# Patient Record
Sex: FEMALE | Race: WHITE | Employment: PART TIME | URBAN - METROPOLITAN AREA
[De-identification: names, ages, dates, MRNs, and addresses within clinical notes are randomized per-mention and may not be internally consistent; named-entity substitution may affect disease eponyms.]

---

## 2018-08-12 ENCOUNTER — APPOINTMENT (OUTPATIENT)
Dept: GENERAL RADIOLOGY | Age: 53
End: 2018-08-12
Payer: COMMERCIAL

## 2018-08-12 ENCOUNTER — HOSPITAL ENCOUNTER (EMERGENCY)
Age: 53
Discharge: HOME | End: 2018-08-12
Attending: EMERGENCY MEDICINE
Payer: COMMERCIAL

## 2018-08-12 VITALS
DIASTOLIC BLOOD PRESSURE: 80 MMHG | SYSTOLIC BLOOD PRESSURE: 130 MMHG | OXYGEN SATURATION: 98 % | HEIGHT: 62 IN | BODY MASS INDEX: 33.13 KG/M2 | HEART RATE: 82 BPM | TEMPERATURE: 98.6 F | RESPIRATION RATE: 12 BRPM | WEIGHT: 180 LBS

## 2018-08-12 DIAGNOSIS — J18.9 PNEUMONIA DUE TO ORGANISM: Primary | ICD-10-CM

## 2018-08-12 PROCEDURE — 94664 DEMO&/EVAL PT USE INHALER: CPT

## 2018-08-12 PROCEDURE — 99283 EMERGENCY DEPT VISIT LOW MDM: CPT

## 2018-08-12 PROCEDURE — 6370000000 HC RX 637 (ALT 250 FOR IP): Performed by: EMERGENCY MEDICINE

## 2018-08-12 PROCEDURE — 94640 AIRWAY INHALATION TREATMENT: CPT

## 2018-08-12 PROCEDURE — 94761 N-INVAS EAR/PLS OXIMETRY MLT: CPT

## 2018-08-12 PROCEDURE — 71046 X-RAY EXAM CHEST 2 VIEWS: CPT

## 2018-08-12 RX ORDER — ALBUTEROL SULFATE 90 UG/1
2 AEROSOL, METERED RESPIRATORY (INHALATION) EVERY 6 HOURS PRN
COMMUNITY
End: 2019-10-12

## 2018-08-12 RX ORDER — IPRATROPIUM BROMIDE AND ALBUTEROL SULFATE 2.5; .5 MG/3ML; MG/3ML
1 SOLUTION RESPIRATORY (INHALATION) ONCE
Status: COMPLETED | OUTPATIENT
Start: 2018-08-12 | End: 2018-08-12

## 2018-08-12 RX ORDER — PREDNISONE 20 MG/1
60 TABLET ORAL DAILY
Qty: 12 TABLET | Refills: 0 | Status: SHIPPED | OUTPATIENT
Start: 2018-08-12 | End: 2019-10-12

## 2018-08-12 RX ORDER — LEVOFLOXACIN 750 MG/1
750 TABLET ORAL DAILY
Qty: 7 TABLET | Refills: 0 | Status: SHIPPED | OUTPATIENT
Start: 2018-08-12 | End: 2018-08-14 | Stop reason: ALTCHOICE

## 2018-08-12 RX ORDER — MELOXICAM 15 MG/1
15 TABLET ORAL DAILY
COMMUNITY
End: 2019-10-12

## 2018-08-12 RX ORDER — PREDNISONE 20 MG/1
60 TABLET ORAL ONCE
Status: COMPLETED | OUTPATIENT
Start: 2018-08-12 | End: 2018-08-12

## 2018-08-12 RX ORDER — LEVOFLOXACIN 500 MG/1
500 TABLET, FILM COATED ORAL DAILY
Status: DISCONTINUED | OUTPATIENT
Start: 2018-08-12 | End: 2018-08-12 | Stop reason: HOSPADM

## 2018-08-12 RX ADMIN — LEVOFLOXACIN 500 MG: 500 TABLET, FILM COATED ORAL at 12:24

## 2018-08-12 RX ADMIN — PREDNISONE 60 MG: 20 TABLET ORAL at 11:49

## 2018-08-12 RX ADMIN — IPRATROPIUM BROMIDE AND ALBUTEROL SULFATE 1 AMPULE: .5; 3 SOLUTION RESPIRATORY (INHALATION) at 11:52

## 2018-08-12 ASSESSMENT — PAIN DESCRIPTION - PAIN TYPE: TYPE: ACUTE PAIN

## 2018-08-12 ASSESSMENT — PAIN DESCRIPTION - DESCRIPTORS: DESCRIPTORS: ACHING

## 2018-08-12 ASSESSMENT — PAIN DESCRIPTION - LOCATION: LOCATION: THROAT;EAR

## 2018-08-12 ASSESSMENT — PAIN SCALES - GENERAL: PAINLEVEL_OUTOF10: 8

## 2018-08-12 NOTE — ED TRIAGE NOTES
Patient c/o cough with sore throat and earaches since last Monday, went to  on Friday and put on Z kamala,  Feels like she is getting worse.

## 2018-08-12 NOTE — ED NOTES
Patient states that she feels a lot better, breathing much easier. Pt given d/c instructions with return verbalization including meds,  Reviewed each med with patient. Emphasis on f/u, to return with worsening s/s. Pt ambulated to [facility] with steady gait.      Arnulfo De Leon RN  08/12/18 3111

## 2018-08-12 NOTE — ED PROVIDER NOTES
[facility] Emergency Department    CHIEF COMPLAINT  Cough      HISTORY OF PRESENT ILLNESS  Radha Magaña is a 47 y.o. female presenting with a cough for the past several days. She was started on a Z-Fazal on Friday but feels like she is not improving. She also has some sinus congestion and earache and sore throat. She has had a subjective fever. No other complaints, modifying factors or associated symptoms. I have reviewed the following from the nursing documentation. Past Medical History:   Diagnosis Date    Diop's esophagus     Fibromyalgia     IBS (irritable bowel syndrome)      Past Surgical History:   Procedure Laterality Date    COLONOSCOPY      OTHER SURGICAL HISTORY      leap procedure and uterine ablation    UPPER GASTROINTESTINAL ENDOSCOPY       History reviewed. No pertinent family history. Social History     Social History    Marital status:      Spouse name: N/A    Number of children: N/A    Years of education: N/A     Occupational History    Not on file. Social History Main Topics    Smoking status: Never Smoker    Smokeless tobacco: Never Used    Alcohol use Yes      Comment: occ.     Drug use: No    Sexual activity: Yes     Partners: Male     Other Topics Concern    Not on file     Social History Narrative    No narrative on file     Current Facility-Administered Medications   Medication Dose Route Frequency Provider Last Rate Last Dose    levofloxacin (LEVAQUIN) tablet 500 mg  500 mg Oral Daily Marychuy Maravilla, DO   500 mg at 08/12/18 1224     Current Outpatient Prescriptions   Medication Sig Dispense Refill    meloxicam (MOBIC) 15 MG tablet Take 15 mg by mouth daily      albuterol sulfate  (90 Base) MCG/ACT inhaler Inhale 2 puffs into the lungs every 6 hours as needed for Wheezing      levofloxacin (LEVAQUIN) 750 MG tablet Take 1 tablet by mouth daily for 7 days 7 tablet 0    predniSONE (DELTASONE) 20 MG tablet Take 3 tablets by mouth daily 12 tablet 0    omeprazole (PRILOSEC) 20 MG capsule Take 40 mg by mouth daily       Allergies   Allergen Reactions    Bee Venom Shortness Of Breath, Itching and Swelling    Amoxicillin     Augmentin [Amoxicillin-Pot Clavulanate]     B-12 [Cobalamine Combinations]     Cymbalta [Duloxetine Hcl]        REVIEW OF SYSTEMS  10 systems reviewed, pertinent positives per HPI otherwise noted to be negative. PHYSICAL EXAM  /80   Pulse 82   Temp 98.6 °F (37 °C)   Resp 12   Ht 5' 2\" (1.575 m)   Wt 81.6 kg (180 lb)   SpO2 98%   BMI 32.92 kg/m²   GENERAL APPEARANCE: Awake and alert. Cooperative. No acute distress. HEAD: Normocephalic. Atraumatic. EYES: PERRL. EOM's grossly intact. ENT: Mucous membranes are moist.  Posterior pharyngeal erythema. No peritonsillar abscess. Bilateral tympanic membranes are normal.  NECK: Supple. Non-tender  HEART: RRR. LUNGS: Respirations unlabored. CTAB. Good air exchange. Speaking comfortably in full sentences. ABDOMEN: Soft. Non-distended. Non-tender. No masses. No organomegaly. No guarding or rebound. BACK:  No midline Tenderness. EXTREMITIES: No peripheral edema. Moves all extremities equally. All extremities neurovascularly intact. SKIN: Warm and dry. No acute rashes. NEUROLOGICAL: Alert and oriented. CN's 2-12 intact. No gross facial drooping. Strength 5/5, sensation intact. 2 plus DTR's in lower extremity bilaterally. Gait normal.   PSYCHIATRIC: Normal mood and affect. RADIOLOGY    Xr Chest Standard (2 Vw)    Result Date: 8/12/2018  EXAM: XR CHEST (2 VW) INDICATION: cough, COMPARISON: July 9, 2016 FINDINGS: Medical Devices: None. Lungs: Curvilinear opacity in the left midlung zone. Calcified granuloma in the left upper lung zone. No pneumothorax or pleural effusion. Heart and Mediastinum: Normal. Other: N/A. Curvilinear opacity in the left midlung zone, favor atelectasis over pneumonia. ED COURSE/MDM  Patient seen and evaluated.  Old records reviewed. Labs and imaging reviewed and results discussed with patient. 64-year-old female presenting with cough and sore throat and found to have what is likely pneumonia on chest x-ray. Patient is otherwise well appearing afebrile and nontoxic with 98% pulse ox on room air. I do believe patient can be further treated as an outpatient with oral antibiotics. She was told to continue the Z-Fazal. She has 2 more doses of Zithromax. She was also given prescription for Levaquin as I would've liked to have seen more improvement by this time with the Zithromax. CLINICAL IMPRESSION  1. Pneumonia due to organism        Blood pressure 130/80, pulse 82, temperature 98.6 °F (37 °C), resp. rate 12, height 5' 2\" (1.575 m), weight 81.6 kg (180 lb), SpO2 98 %, not currently breastfeeding. Veryl Buerger was discharged to home in stable condition.            Amarilis Parker DO  08/12/18 4830

## 2018-08-14 ENCOUNTER — APPOINTMENT (OUTPATIENT)
Dept: GENERAL RADIOLOGY | Age: 53
End: 2018-08-14
Payer: COMMERCIAL

## 2018-08-14 ENCOUNTER — HOSPITAL ENCOUNTER (EMERGENCY)
Age: 53
Discharge: HOME | End: 2018-08-14
Attending: EMERGENCY MEDICINE
Payer: COMMERCIAL

## 2018-08-14 VITALS
HEIGHT: 61 IN | RESPIRATION RATE: 18 BRPM | DIASTOLIC BLOOD PRESSURE: 91 MMHG | TEMPERATURE: 98.4 F | WEIGHT: 198 LBS | SYSTOLIC BLOOD PRESSURE: 157 MMHG | OXYGEN SATURATION: 98 % | BODY MASS INDEX: 37.38 KG/M2 | HEART RATE: 107 BPM

## 2018-08-14 DIAGNOSIS — J18.9 PNEUMONIA DUE TO ORGANISM: Primary | ICD-10-CM

## 2018-08-14 LAB
ANION GAP SERPL CALCULATED.3IONS-SCNC: 13 MMOL/L (ref 3–16)
BASOPHILS ABSOLUTE: 0.1 K/UL (ref 0–0.2)
BASOPHILS RELATIVE PERCENT: 1.7 %
BUN BLDV-MCNC: 18 MG/DL (ref 7–20)
CALCIUM SERPL-MCNC: 9.6 MG/DL (ref 8.3–10.6)
CHLORIDE BLD-SCNC: 102 MMOL/L (ref 99–110)
CO2: 22 MMOL/L (ref 21–32)
CREAT SERPL-MCNC: 0.6 MG/DL (ref 0.6–1.1)
EOSINOPHILS ABSOLUTE: 0 K/UL (ref 0–0.6)
EOSINOPHILS RELATIVE PERCENT: 0.3 %
GFR AFRICAN AMERICAN: >60
GFR NON-AFRICAN AMERICAN: >60
GLUCOSE BLD-MCNC: 142 MG/DL (ref 70–99)
HCT VFR BLD CALC: 40.8 % (ref 36–48)
HEMOGLOBIN: 13.5 G/DL (ref 12–16)
LACTIC ACID: 1.8 MMOL/L (ref 0.4–2)
LYMPHOCYTES ABSOLUTE: 1.2 K/UL (ref 1–5.1)
LYMPHOCYTES RELATIVE PERCENT: 15.1 %
MCH RBC QN AUTO: 28 PG (ref 26–34)
MCHC RBC AUTO-ENTMCNC: 33.2 G/DL (ref 31–36)
MCV RBC AUTO: 84.4 FL (ref 80–100)
MONOCYTES ABSOLUTE: 0.2 K/UL (ref 0–1.3)
MONOCYTES RELATIVE PERCENT: 2.2 %
NEUTROPHILS ABSOLUTE: 6.2 K/UL (ref 1.7–7.7)
NEUTROPHILS RELATIVE PERCENT: 80.7 %
PDW BLD-RTO: 15.3 % (ref 12.4–15.4)
PLATELET # BLD: 316 K/UL (ref 135–450)
PMV BLD AUTO: 7 FL (ref 5–10.5)
POTASSIUM SERPL-SCNC: 4.5 MMOL/L (ref 3.5–5.1)
RBC # BLD: 4.84 M/UL (ref 4–5.2)
SODIUM BLD-SCNC: 137 MMOL/L (ref 136–145)
TROPONIN: <0.01 NG/ML
WBC # BLD: 7.6 K/UL (ref 4–11)

## 2018-08-14 PROCEDURE — 83605 ASSAY OF LACTIC ACID: CPT

## 2018-08-14 PROCEDURE — 71046 X-RAY EXAM CHEST 2 VIEWS: CPT

## 2018-08-14 PROCEDURE — 80048 BASIC METABOLIC PNL TOTAL CA: CPT

## 2018-08-14 PROCEDURE — 99284 EMERGENCY DEPT VISIT MOD MDM: CPT

## 2018-08-14 PROCEDURE — 85025 COMPLETE CBC W/AUTO DIFF WBC: CPT

## 2018-08-14 PROCEDURE — 93005 ELECTROCARDIOGRAM TRACING: CPT | Performed by: EMERGENCY MEDICINE

## 2018-08-14 PROCEDURE — 84484 ASSAY OF TROPONIN QUANT: CPT

## 2018-08-14 RX ORDER — DOXYCYCLINE HYCLATE 100 MG
100 TABLET ORAL 2 TIMES DAILY
Qty: 20 TABLET | Refills: 0 | Status: SHIPPED | OUTPATIENT
Start: 2018-08-14 | End: 2018-08-24

## 2018-08-14 ASSESSMENT — ENCOUNTER SYMPTOMS
EYES NEGATIVE: 1
COUGH: 1
DIARRHEA: 0
ABDOMINAL PAIN: 0
NAUSEA: 0
CONSTIPATION: 0
WHEEZING: 1
SHORTNESS OF BREATH: 0

## 2018-08-14 NOTE — ED PROVIDER NOTES
Date of evaluation: 8/14/2018    Chief Complaint     Medication Reaction (earlier today became confused momentarily after taking levaquin and prednisone  took a Benadryl)      History of Present Illness     Yoly Geller is a 47 y.o. female who presents After being seen on the 12th of the month for cough and chest congestion. This included a sore throat. Patient was placed on a Z-Fazal the Friday before at [facility].  Patient at the time of her visit here that she was getting worse. She was then switched to Levaquin but was told to continue to take the Z-Fazal which was a conflict and didn't want to do. She finished out the Z-Fazal and now has started the Levaquin and then felt momentary confusion which resolved with Benadryl. He states that she's had reactions like this in the past to various medications. Patient states that she had no unilateral weakness or numbness. She felt some tightness in her chest and was slightly sweaty. She had an EpiPen but did not use same. She was not wheezing at the time. She currently continues to have a cough that is nonproductive without fever now. Review of Systems     Review of Systems   Constitutional: Negative for chills and fever. HENT: Negative. Eyes: Negative. Respiratory: Positive for cough and wheezing. Negative for shortness of breath. Cardiovascular: Positive for chest pain and palpitations. Negative for leg swelling. Gastrointestinal: Negative for abdominal pain, constipation, diarrhea and nausea. Genitourinary: Negative for dysuria, frequency, urgency, vaginal bleeding and vaginal discharge. Musculoskeletal: Negative. Skin: Negative. Neurological: Positive for light-headedness. Hematological: Negative for adenopathy. Psychiatric/Behavioral: Negative. All other systems reviewed and are negative.       Past Medical, Surgical, Family, and Social History     She has a past medical history of Diop's esophagus; Fibromyalgia; and IBS (irritable bowel syndrome). She has a past surgical history that includes other surgical history; Colonoscopy; Upper gastrointestinal endoscopy; and Endometrial ablation. Her family history is not on file. She reports that she has never smoked. She has never used smokeless tobacco. She reports that she drinks alcohol. She reports that she does not use drugs. Medications     Previous Medications    ALBUTEROL SULFATE  (90 BASE) MCG/ACT INHALER    Inhale 2 puffs into the lungs every 6 hours as needed for Wheezing    LEVOFLOXACIN (LEVAQUIN) 750 MG TABLET    Take 1 tablet by mouth daily for 7 days    MELOXICAM (MOBIC) 15 MG TABLET    Take 15 mg by mouth daily    OMEPRAZOLE (PRILOSEC) 20 MG CAPSULE    Take 40 mg by mouth daily    PREDNISONE (DELTASONE) 20 MG TABLET    Take 3 tablets by mouth daily       Allergies     She is allergic to bee venom; amoxicillin; augmentin [amoxicillin-pot clavulanate]; b-12 [cobalamine combinations]; and cymbalta [duloxetine hcl]. Physical Exam     INITIAL VITALS: BP: (!) 157/91, Temp: 98.4 °F (36.9 °C), Pulse: 107, Resp: 18, SpO2: 98 %   Physical Exam   Constitutional: She is oriented to person, place, and time. She appears well-developed and well-nourished. No distress. HENT:   Head: Normocephalic. Eyes: Pupils are equal, round, and reactive to light. EOM are normal.   Neck: Neck supple. Cardiovascular: Normal rate, regular rhythm, normal heart sounds and intact distal pulses. Pulmonary/Chest: Breath sounds normal. She has no wheezes. She has no rales. Musculoskeletal: She exhibits no edema or tenderness. Lymphadenopathy:     She has no cervical adenopathy. Neurological: She is alert and oriented to person, place, and time. Skin: Skin is warm and dry. She is not diaphoretic. Psychiatric: She has a normal mood and affect. Nursing note and vitals reviewed.       Diagnostic Results     EKG   Sinus rhythm rate 96 normal axis without any ST-T wave abnormalities. QTC is 434 ms. Patient had been on a macrolide and then Levaquin so there was concern for prolonged QT. RADIOLOGY:  XR CHEST STANDARD (2 VW)   Final Result   1. Patchy left lung opacities favoring pneumonia. Follow-up chest x-ray in one month is recommended to document complete resolution. LABS:   Results for orders placed or performed during the hospital encounter of 08/14/18   CBC Auto Differential   Result Value Ref Range    WBC 7.6 4.0 - 11.0 K/uL    RBC 4.84 4.00 - 5.20 M/uL    Hemoglobin 13.5 12.0 - 16.0 g/dL    Hematocrit 40.8 36.0 - 48.0 %    MCV 84.4 80.0 - 100.0 fL    MCH 28.0 26.0 - 34.0 pg    MCHC 33.2 31.0 - 36.0 g/dL    RDW 15.3 12.4 - 15.4 %    Platelets 193 958 - 395 K/uL    MPV 7.0 5.0 - 10.5 fL    Neutrophils % 80.7 %    Lymphocytes % 15.1 %    Monocytes % 2.2 %    Eosinophils % 0.3 %    Basophils % 1.7 %    Neutrophils # 6.2 1.7 - 7.7 K/uL    Lymphocytes # 1.2 1.0 - 5.1 K/uL    Monocytes # 0.2 0.0 - 1.3 K/uL    Eosinophils # 0.0 0.0 - 0.6 K/uL    Basophils # 0.1 0.0 - 0.2 K/uL   Basic Metabolic Panel   Result Value Ref Range    Sodium 137 136 - 145 mmol/L    Potassium 4.5 3.5 - 5.1 mmol/L    Chloride 102 99 - 110 mmol/L    CO2 22 21 - 32 mmol/L    Anion Gap 13 3 - 16    Glucose 142 (H) 70 - 99 mg/dL    BUN 18 7 - 20 mg/dL    CREATININE 0.6 0.6 - 1.1 mg/dL    GFR Non-African American >60 >60    GFR African American >60 >60    Calcium 9.6 8.3 - 10.6 mg/dL   Lactic Acid, Plasma   Result Value Ref Range    Lactic Acid 1.8 0.4 - 2.0 mmol/L   Troponin   Result Value Ref Range    Troponin <0.01 <0.01 ng/mL       ED BEDSIDE ULTRASOUND:      RECENT VITALS:  BP: (!) 157/91, Temp: 98.4 °F (36.9 °C), Pulse: 107, Resp: 18, SpO2: 98 %     Procedures         ED Course     Nursing Notes, Past Medical Hx, Past Surgical Hx, Social Hx, Allergies, and Family Hx were reviewed.     The patient was given the following medications:  No orders of the defined types were placed in this

## 2018-08-15 LAB
EKG ATRIAL RATE: 96 BPM
EKG DIAGNOSIS: NORMAL
EKG P AXIS: 28 DEGREES
EKG P-R INTERVAL: 130 MS
EKG Q-T INTERVAL: 344 MS
EKG QRS DURATION: 76 MS
EKG QTC CALCULATION (BAZETT): 434 MS
EKG R AXIS: 0 DEGREES
EKG T AXIS: 13 DEGREES
EKG VENTRICULAR RATE: 96 BPM

## 2018-08-15 PROCEDURE — 93010 ELECTROCARDIOGRAM REPORT: CPT | Performed by: INTERNAL MEDICINE

## 2018-10-06 ENCOUNTER — HOSPITAL ENCOUNTER (EMERGENCY)
Age: 53
Discharge: HOME | End: 2018-10-06
Attending: EMERGENCY MEDICINE
Payer: COMMERCIAL

## 2018-10-06 VITALS
RESPIRATION RATE: 17 BRPM | WEIGHT: 180 LBS | OXYGEN SATURATION: 97 % | DIASTOLIC BLOOD PRESSURE: 81 MMHG | SYSTOLIC BLOOD PRESSURE: 129 MMHG | HEIGHT: 61 IN | HEART RATE: 80 BPM | TEMPERATURE: 98.1 F | BODY MASS INDEX: 33.99 KG/M2

## 2018-10-06 DIAGNOSIS — M79.605 LEFT LEG PAIN: Primary | ICD-10-CM

## 2018-10-06 DIAGNOSIS — R79.89 ELEVATED D-DIMER: ICD-10-CM

## 2018-10-06 LAB
ANION GAP SERPL CALCULATED.3IONS-SCNC: 11 MMOL/L (ref 3–16)
BASOPHILS ABSOLUTE: 0 K/UL (ref 0–0.2)
BASOPHILS RELATIVE PERCENT: 0.7 %
BUN BLDV-MCNC: 14 MG/DL (ref 7–20)
CALCIUM SERPL-MCNC: 9.8 MG/DL (ref 8.3–10.6)
CHLORIDE BLD-SCNC: 104 MMOL/L (ref 99–110)
CO2: 24 MMOL/L (ref 21–32)
CREAT SERPL-MCNC: <0.5 MG/DL (ref 0.6–1.1)
D DIMER: 336 NG/ML DDU (ref 0–229)
EOSINOPHILS ABSOLUTE: 0.2 K/UL (ref 0–0.6)
EOSINOPHILS RELATIVE PERCENT: 2.6 %
GFR AFRICAN AMERICAN: >60
GFR NON-AFRICAN AMERICAN: >60
GLUCOSE BLD-MCNC: 110 MG/DL (ref 70–99)
HCT VFR BLD CALC: 38.5 % (ref 36–48)
HEMOGLOBIN: 12.9 G/DL (ref 12–16)
LYMPHOCYTES ABSOLUTE: 2.8 K/UL (ref 1–5.1)
LYMPHOCYTES RELATIVE PERCENT: 42.3 %
MAGNESIUM: 2.2 MG/DL (ref 1.8–2.4)
MCH RBC QN AUTO: 28.7 PG (ref 26–34)
MCHC RBC AUTO-ENTMCNC: 33.6 G/DL (ref 31–36)
MCV RBC AUTO: 85.5 FL (ref 80–100)
MONOCYTES ABSOLUTE: 0.6 K/UL (ref 0–1.3)
MONOCYTES RELATIVE PERCENT: 9.4 %
NEUTROPHILS ABSOLUTE: 3 K/UL (ref 1.7–7.7)
NEUTROPHILS RELATIVE PERCENT: 45 %
PDW BLD-RTO: 15.7 % (ref 12.4–15.4)
PLATELET # BLD: 278 K/UL (ref 135–450)
PMV BLD AUTO: 7.3 FL (ref 5–10.5)
POTASSIUM SERPL-SCNC: 3.9 MMOL/L (ref 3.5–5.1)
RBC # BLD: 4.51 M/UL (ref 4–5.2)
SODIUM BLD-SCNC: 139 MMOL/L (ref 136–145)
TOTAL CK: 96 U/L (ref 26–192)
TROPONIN: <0.01 NG/ML
WBC # BLD: 6.7 K/UL (ref 4–11)

## 2018-10-06 PROCEDURE — 85025 COMPLETE CBC W/AUTO DIFF WBC: CPT

## 2018-10-06 PROCEDURE — 85379 FIBRIN DEGRADATION QUANT: CPT

## 2018-10-06 PROCEDURE — 36415 COLL VENOUS BLD VENIPUNCTURE: CPT

## 2018-10-06 PROCEDURE — 96372 THER/PROPH/DIAG INJ SC/IM: CPT

## 2018-10-06 PROCEDURE — 6370000000 HC RX 637 (ALT 250 FOR IP): Performed by: EMERGENCY MEDICINE

## 2018-10-06 PROCEDURE — 83735 ASSAY OF MAGNESIUM: CPT

## 2018-10-06 PROCEDURE — 84484 ASSAY OF TROPONIN QUANT: CPT

## 2018-10-06 PROCEDURE — 82550 ASSAY OF CK (CPK): CPT

## 2018-10-06 PROCEDURE — 6360000002 HC RX W HCPCS: Performed by: EMERGENCY MEDICINE

## 2018-10-06 PROCEDURE — 80048 BASIC METABOLIC PNL TOTAL CA: CPT

## 2018-10-06 PROCEDURE — 99284 EMERGENCY DEPT VISIT MOD MDM: CPT

## 2018-10-06 RX ORDER — DIPHENHYDRAMINE HCL 25 MG
25 TABLET ORAL ONCE
Status: COMPLETED | OUTPATIENT
Start: 2018-10-06 | End: 2018-10-06

## 2018-10-06 RX ADMIN — ENOXAPARIN SODIUM 120 MG: 120 INJECTION SUBCUTANEOUS at 08:37

## 2018-10-06 RX ADMIN — DIPHENHYDRAMINE HCL 25 MG: 25 TABLET ORAL at 06:58

## 2018-10-06 ASSESSMENT — PAIN DESCRIPTION - LOCATION: LOCATION: LEG

## 2018-10-06 ASSESSMENT — ENCOUNTER SYMPTOMS
VOMITING: 0
CHEST TIGHTNESS: 0
SHORTNESS OF BREATH: 0
NAUSEA: 0
ABDOMINAL PAIN: 0
BACK PAIN: 0

## 2018-10-06 ASSESSMENT — PAIN DESCRIPTION - DESCRIPTORS: DESCRIPTORS: SHARP

## 2018-10-06 ASSESSMENT — PAIN DESCRIPTION - ONSET: ONSET: AWAKENED FROM SLEEP

## 2018-10-06 ASSESSMENT — PAIN DESCRIPTION - ORIENTATION: ORIENTATION: RIGHT;LEFT

## 2018-10-06 ASSESSMENT — PAIN SCALES - GENERAL: PAINLEVEL_OUTOF10: 7

## 2018-10-06 NOTE — ED PROVIDER NOTES
[address] [facility]          ATTENDING PHYSICIAN NOTE       Date of evaluation: 10/6/2018    ADDENDUM:      Care of this patient was assumed from Dr. Rosa Lam. The patient was seen for Leg Pain (Bilateral )  . The patient's initial evaluation and plan have been discussed with the prior provider who initially evaluated the patient. Nursing Notes, Past Medical Hx, Past Surgical Hx, Social Hx, Allergies, and Family Hx were all reviewed.     Diagnostic Results     RADIOLOGY:  VL Extremity Venous Left    (Results Pending)   VL Extremity Venous Left    (Results Pending)       LABS:   Results for orders placed or performed during the hospital encounter of 10/06/18   CBC Auto Differential   Result Value Ref Range    WBC 6.7 4.0 - 11.0 K/uL    RBC 4.51 4.00 - 5.20 M/uL    Hemoglobin 12.9 12.0 - 16.0 g/dL    Hematocrit 38.5 36.0 - 48.0 %    MCV 85.5 80.0 - 100.0 fL    MCH 28.7 26.0 - 34.0 pg    MCHC 33.6 31.0 - 36.0 g/dL    RDW 15.7 (H) 12.4 - 15.4 %    Platelets 529 351 - 396 K/uL    MPV 7.3 5.0 - 10.5 fL    Neutrophils % 45.0 %    Lymphocytes % 42.3 %    Monocytes % 9.4 %    Eosinophils % 2.6 %    Basophils % 0.7 %    Neutrophils # 3.0 1.7 - 7.7 K/uL    Lymphocytes # 2.8 1.0 - 5.1 K/uL    Monocytes # 0.6 0.0 - 1.3 K/uL    Eosinophils # 0.2 0.0 - 0.6 K/uL    Basophils # 0.0 0.0 - 0.2 K/uL   Basic Metabolic Panel   Result Value Ref Range    Sodium 139 136 - 145 mmol/L    Potassium 3.9 3.5 - 5.1 mmol/L    Chloride 104 99 - 110 mmol/L    CO2 24 21 - 32 mmol/L    Anion Gap 11 3 - 16    Glucose 110 (H) 70 - 99 mg/dL    BUN 14 7 - 20 mg/dL    CREATININE <0.5 (L) 0.6 - 1.1 mg/dL    GFR Non-African American >60 >60    GFR African American >60 >60    Calcium 9.8 8.3 - 10.6 mg/dL   D-dimer, quantitative (Lab)   Result Value Ref Range    D-Dimer, Quant 336 (H) 0 - 229 ng/mL DDU   Troponin (lab)   Result Value Ref Range    Troponin <0.01 <0.01 ng/mL   Magnesium   Result Value Ref Range    Magnesium 2. 20 1.80 - 2.40 mg/dL   CK   Result Value Ref Range    Total CK 96 26 - 192 U/L       RECENT VITALS:  BP: 129/81, Temp: 98.1 °F (36.7 °C), Pulse: 80, Resp: 17, SpO2: 97 %       ED Course     The patient was given the following medications:  Orders Placed This Encounter   Medications    diphenhydrAMINE (BENADRYL) tablet 25 mg    enoxaparin (LOVENOX) injection 120 mg    enoxaparin (LOVENOX) 120 MG/0.8ML injection     Sig: Inject 0.8 mLs into the skin daily for 2 doses     Dispense:  1.6 mL     Refill:  0       CONSULTS:  None    MEDICAL DECISION MAKING / ASSESSMENT / Jocelyne Aileen is a 47 y.o. female who presents emergency Department with atraumatic left leg pain. On my assessment the patient had a normal vascular exam in the left lower extremity. She hadsignificant swelling, ecchymoses or skin changes. There is no evidence of plethora. Patient had no identifiable muscular tenderness or no rashes or lesions. She had no reproducible lumbar spine tenderness or step-off. The patient had laboratory investigations and plan off: Provider which was remarkable for a positive d-dimer. I also added on a CK which was normal.  The patient is unable to have a Doppler ultrasound performed today secondary to on availability of this resource shows she will be provided with a 24-hour dose of Lovenox here this morning and a second dose and a former per prescription, which she consulted administered home. She was given teaching on how to administer Lovenox per nursing protocol and was instructed that she could return to the emergency department if she felt uncomfortable administering this medication tomorrow. I will refer the patient for an outpatient ultrasound and have the patient follow up with her primary care provider for further evaluation. Clinical Impression     1. Left leg pain    2.  Elevated d-dimer        Disposition     PATIENT REFERRED TO:  Stiven Lamar            DISCHARGE MEDICATIONS:  New

## 2018-10-06 NOTE — ED PROVIDER NOTES
History     She has a past medical history of Diop's esophagus; Fibromyalgia; and IBS (irritable bowel syndrome). She has a past surgical history that includes other surgical history; Colonoscopy; Upper gastrointestinal endoscopy; and Endometrial ablation. Her family history is not on file. She reports that she has never smoked. She has never used smokeless tobacco. She reports that she drinks alcohol. She reports that she does not use drugs. Medications     Previous Medications    ALBUTEROL SULFATE  (90 BASE) MCG/ACT INHALER    Inhale 2 puffs into the lungs every 6 hours as needed for Wheezing    MELOXICAM (MOBIC) 15 MG TABLET    Take 15 mg by mouth daily    OMEPRAZOLE (PRILOSEC) 20 MG CAPSULE    Take 40 mg by mouth daily    PREDNISONE (DELTASONE) 20 MG TABLET    Take 3 tablets by mouth daily       Allergies     She is allergic to bee venom; levaquin [levofloxacin in d5w]; amoxicillin; augmentin [amoxicillin-pot clavulanate]; b-12 [cobalamine combinations]; and cymbalta [duloxetine hcl]. Physical Exam     INITIAL VITALS: BP: (!) 139/91, Temp: 98.1 °F (36.7 °C), Pulse: 85, Resp: 17, SpO2: 94 %   Physical Exam   Constitutional: She is oriented to person, place, and time. She appears well-developed and well-nourished. HENT:   Head: Normocephalic. Cardiovascular: Normal rate, regular rhythm, normal heart sounds and intact distal pulses. 2+ DP pulse in left foot    Pulmonary/Chest: Effort normal. No respiratory distress. She has no wheezes. She has no rales. Musculoskeletal: Normal range of motion. She exhibits no edema, tenderness or deformity. No calf edema, erythema, or tenderness to palpation b/l. No notable deformity. 5/5 strength in the left lower extremity    Neurological: She is alert and oriented to person, place, and time. +SILT in LLE   Skin: Skin is warm and dry. No rash noted. No erythema. No pallor. No erythema or rash noted to left leg.  No urticaria on the chest, abdomen, is a 47 y.o. female who presents with pain in her left leg, it started this evening while she was asleep, woke her up out of bed. Patient denied any recent strenuous activity, trauma, or falls. When she had the left pain she stood up and felt dizzy, described as \"faint\". She denied any chest pain or chest pressure during this episode. Unable to apply PERC criteria due to age. On exam the patient had no notable deformities to her left lower extremity. She had full range of motion in her left leg. She was neurovascularly intact in left lower extremity. There is no calf edema, erythema, or tenderness to palpation. No clinical evidence of fracture, dislocation, vascular injury, DVT. Will check basic labs and electrolytes to rule out an electrolyte deficiency that could be causing left leg cramping. Due to pain in leg and patient's age, d-dimer was obtained to rule out DVT. 0715 transfer of care to oncoming provider pending labs and dispo      Clinical Impression     No diagnosis found. Disposition     PATIENT REFERRED TO:  No follow-up provider specified.     DISCHARGE MEDICATIONS:  New Prescriptions    No medications on file       [address] Alok Bell MD  10/06/18 0496

## 2018-10-08 ENCOUNTER — HOSPITAL ENCOUNTER (OUTPATIENT)
Dept: VASCULAR LAB | Age: 53
Discharge: HOME | End: 2018-10-08
Payer: COMMERCIAL

## 2018-10-08 DIAGNOSIS — R79.89 ELEVATED D-DIMER: ICD-10-CM

## 2018-10-08 DIAGNOSIS — M79.605 LEFT LEG PAIN: ICD-10-CM

## 2018-10-08 PROCEDURE — 93971 EXTREMITY STUDY: CPT

## 2019-07-28 ENCOUNTER — HOSPITAL ENCOUNTER (EMERGENCY)
Age: 54
Discharge: HOME | End: 2019-07-28
Attending: EMERGENCY MEDICINE
Payer: COMMERCIAL

## 2019-07-28 VITALS
DIASTOLIC BLOOD PRESSURE: 69 MMHG | WEIGHT: 191.7 LBS | HEIGHT: 61 IN | HEART RATE: 68 BPM | TEMPERATURE: 98.3 F | SYSTOLIC BLOOD PRESSURE: 126 MMHG | RESPIRATION RATE: 17 BRPM | BODY MASS INDEX: 36.19 KG/M2 | OXYGEN SATURATION: 97 %

## 2019-07-28 DIAGNOSIS — N30.01 ACUTE CYSTITIS WITH HEMATURIA: Primary | ICD-10-CM

## 2019-07-28 LAB
BACTERIA: ABNORMAL /HPF
BILIRUBIN URINE: NEGATIVE
BLOOD, URINE: ABNORMAL
CLARITY: ABNORMAL
COLOR: YELLOW
EPITHELIAL CELLS, UA: ABNORMAL /HPF
GLUCOSE URINE: NEGATIVE MG/DL
HCG(URINE) PREGNANCY TEST: NEGATIVE
KETONES, URINE: ABNORMAL MG/DL
LEUKOCYTE ESTERASE, URINE: ABNORMAL
MICROSCOPIC EXAMINATION: YES
NITRITE, URINE: POSITIVE
PH UA: 6 (ref 5–8)
PROTEIN UA: 100 MG/DL
RBC UA: >100 /HPF (ref 0–2)
SPECIFIC GRAVITY UA: >=1.03 (ref 1–1.03)
URINE REFLEX TO CULTURE: YES
URINE TYPE: ABNORMAL
UROBILINOGEN, URINE: 0.2 E.U./DL
WBC UA: >100 /HPF (ref 0–5)

## 2019-07-28 PROCEDURE — 99283 EMERGENCY DEPT VISIT LOW MDM: CPT

## 2019-07-28 PROCEDURE — 81001 URINALYSIS AUTO W/SCOPE: CPT

## 2019-07-28 PROCEDURE — 87186 SC STD MICRODIL/AGAR DIL: CPT

## 2019-07-28 PROCEDURE — 87086 URINE CULTURE/COLONY COUNT: CPT

## 2019-07-28 PROCEDURE — 87077 CULTURE AEROBIC IDENTIFY: CPT

## 2019-07-28 PROCEDURE — 84703 CHORIONIC GONADOTROPIN ASSAY: CPT

## 2019-07-28 PROCEDURE — 6370000000 HC RX 637 (ALT 250 FOR IP): Performed by: EMERGENCY MEDICINE

## 2019-07-28 RX ORDER — NITROFURANTOIN 25; 75 MG/1; MG/1
100 CAPSULE ORAL 2 TIMES DAILY
Qty: 14 CAPSULE | Refills: 0 | Status: SHIPPED | OUTPATIENT
Start: 2019-07-28 | End: 2019-08-04

## 2019-07-28 RX ORDER — IBUPROFEN 800 MG/1
800 TABLET ORAL EVERY 6 HOURS PRN
COMMUNITY

## 2019-07-28 RX ORDER — NITROFURANTOIN 25; 75 MG/1; MG/1
100 CAPSULE ORAL EVERY 12 HOURS SCHEDULED
Status: DISCONTINUED | OUTPATIENT
Start: 2019-07-28 | End: 2019-07-29 | Stop reason: HOSPADM

## 2019-07-28 RX ADMIN — NITROFURANTOIN MONOHYDRATE AND NITROFURANTOIN, MACROCRYSTALLINE 100 MG: 75; 25 CAPSULE ORAL at 23:45

## 2019-07-28 ASSESSMENT — PAIN DESCRIPTION - PAIN TYPE: TYPE: ACUTE PAIN

## 2019-07-28 ASSESSMENT — PAIN DESCRIPTION - DESCRIPTORS: DESCRIPTORS: DISCOMFORT

## 2019-07-28 ASSESSMENT — PAIN DESCRIPTION - LOCATION: LOCATION: VAGINA

## 2019-07-28 ASSESSMENT — PAIN SCALES - GENERAL: PAINLEVEL_OUTOF10: 8

## 2019-07-29 NOTE — ED PROVIDER NOTES
Triage Chief Complaint:   Urinary Tract Infection (c/o Having UTI symptoms since this AM; c/o Urine being dark and started having burning/urgency later on thru the day. )      [place]:  Luz Marina Yadav is a 49 y.o. female that presents with frequency urgency and dysuria. Patient's not vomiting or febrile did not have back pain. She has had urinary tract infections in the past.  She has also had kidney stones and feels that this is clearly different. She did note a small amount of blood in her urine. She has Diop's esophagus and irritable bowel syndrome but is otherwise healthy. Symptoms began earlier today    ROS:  Review of systems was reviewed for 10 systems and is otherwise negative except as in the [address]    Past Medical History:   Diagnosis Date    Diop's esophagus     Fibromyalgia     IBS (irritable bowel syndrome)      Past Surgical History:   Procedure Laterality Date    COLONOSCOPY      ENDOMETRIAL ABLATION      OTHER SURGICAL HISTORY      leap procedure and uterine ablation    RECTOCELE REPAIR      UPPER GASTROINTESTINAL ENDOSCOPY       History reviewed. No pertinent family history. Social History     Socioeconomic History    Marital status:      Spouse name: Not on file    Number of children: Not on file    Years of education: Not on file    Highest education level: Not on file   Occupational History    Not on file   Social Needs    Financial resource strain: Not on file    Food insecurity:     Worry: Not on file     Inability: Not on file    Transportation needs:     Medical: Not on file     Non-medical: Not on file   Tobacco Use    Smoking status: Never Smoker    Smokeless tobacco: Never Used   Substance and Sexual Activity    Alcohol use: Yes     Comment: occ.     Drug use: No    Sexual activity: Yes     Partners: Male   Lifestyle    Physical activity:     Days per week: Not on file     Minutes per session: Not on file    Stress: Not on file   Relationships    Social connections:     Talks on phone: Not on file     Gets together: Not on file     Attends [employer] service: Not on file     Active member of club or organization: Not on file     Attends meetings of clubs or organizations: Not on file     Relationship status: Not on file    Intimate partner violence:     Fear of current or ex partner: Not on file     Emotionally abused: Not on file     Physically abused: Not on file     Forced sexual activity: Not on file   Other Topics Concern    Not on file   Social History Narrative    Not on file     No current facility-administered medications for this encounter. Current Outpatient Medications   Medication Sig Dispense Refill    ibuprofen (ADVIL;MOTRIN) 800 MG tablet Take 800 mg by mouth every 6 hours as needed for Pain      omeprazole (PRILOSEC) 20 MG capsule Take 40 mg by mouth daily      meloxicam (MOBIC) 15 MG tablet Take 15 mg by mouth daily      albuterol sulfate  (90 Base) MCG/ACT inhaler Inhale 2 puffs into the lungs every 6 hours as needed for Wheezing      predniSONE (DELTASONE) 20 MG tablet Take 3 tablets by mouth daily 12 tablet 0     Allergies   Allergen Reactions    Bee Venom Shortness Of Breath, Itching and Swelling    Levaquin [Levofloxacin In D5w] Other (See Comments)     Loss of \"ability to think\"    Amoxicillin     Augmentin [Amoxicillin-Pot Clavulanate]     B-12 [Cobalamine Combinations]     Cymbalta [Duloxetine Hcl]      Nursing Notes Reviewed    Physical Exam:  ED Triage Vitals [07/28/19 2245]   Enc Vitals Group      /69      Pulse 68      Resp 17      Temp 98.3 °F (36.8 °C)      Temp Source Oral      SpO2 97 %      Weight 191 lb 11.2 oz (87 kg)      Height 5' 1\" (1.549 m)      Head Circumference       Peak Flow       Pain Score       Pain Loc       Pain Edu? Excl. in [address]?         GENERAL APPEARANCE: A well-developed well-nourished very pleasant 60-year-old female in mild distress  HEAD: Normocephalic, atraumatic  EYES:

## 2019-07-31 LAB
ORGANISM: ABNORMAL
URINE CULTURE, ROUTINE: ABNORMAL
URINE CULTURE, ROUTINE: ABNORMAL

## 2019-10-12 ENCOUNTER — HOSPITAL ENCOUNTER (EMERGENCY)
Age: 54
Discharge: HOME | End: 2019-10-12
Attending: EMERGENCY MEDICINE
Payer: COMMERCIAL

## 2019-10-12 ENCOUNTER — APPOINTMENT (OUTPATIENT)
Dept: GENERAL RADIOLOGY | Age: 54
End: 2019-10-12
Payer: COMMERCIAL

## 2019-10-12 VITALS
TEMPERATURE: 98 F | SYSTOLIC BLOOD PRESSURE: 119 MMHG | OXYGEN SATURATION: 100 % | WEIGHT: 184.1 LBS | BODY MASS INDEX: 34.79 KG/M2 | DIASTOLIC BLOOD PRESSURE: 72 MMHG | HEART RATE: 79 BPM | RESPIRATION RATE: 18 BRPM

## 2019-10-12 DIAGNOSIS — L50.9 URTICARIA: ICD-10-CM

## 2019-10-12 DIAGNOSIS — T78.40XA ALLERGIC REACTION, INITIAL ENCOUNTER: Primary | ICD-10-CM

## 2019-10-12 LAB
EKG ATRIAL RATE: 80 BPM
EKG DIAGNOSIS: NORMAL
EKG P AXIS: 31 DEGREES
EKG P-R INTERVAL: 134 MS
EKG Q-T INTERVAL: 372 MS
EKG QRS DURATION: 78 MS
EKG QTC CALCULATION (BAZETT): 429 MS
EKG R AXIS: 22 DEGREES
EKG T AXIS: 38 DEGREES
EKG VENTRICULAR RATE: 80 BPM

## 2019-10-12 PROCEDURE — 96375 TX/PRO/DX INJ NEW DRUG ADDON: CPT

## 2019-10-12 PROCEDURE — 93005 ELECTROCARDIOGRAM TRACING: CPT | Performed by: EMERGENCY MEDICINE

## 2019-10-12 PROCEDURE — 96374 THER/PROPH/DIAG INJ IV PUSH: CPT

## 2019-10-12 PROCEDURE — 96361 HYDRATE IV INFUSION ADD-ON: CPT

## 2019-10-12 PROCEDURE — 6360000002 HC RX W HCPCS: Performed by: EMERGENCY MEDICINE

## 2019-10-12 PROCEDURE — 99283 EMERGENCY DEPT VISIT LOW MDM: CPT

## 2019-10-12 PROCEDURE — 93010 ELECTROCARDIOGRAM REPORT: CPT | Performed by: INTERNAL MEDICINE

## 2019-10-12 PROCEDURE — 71046 X-RAY EXAM CHEST 2 VIEWS: CPT

## 2019-10-12 PROCEDURE — 2580000003 HC RX 258: Performed by: EMERGENCY MEDICINE

## 2019-10-12 RX ORDER — DIPHENHYDRAMINE HCL 25 MG
25 CAPSULE ORAL EVERY 6 HOURS PRN
Qty: 30 CAPSULE | Refills: 0 | Status: SHIPPED | OUTPATIENT
Start: 2019-10-12 | End: 2019-10-22

## 2019-10-12 RX ORDER — 0.9 % SODIUM CHLORIDE 0.9 %
1000 INTRAVENOUS SOLUTION INTRAVENOUS ONCE
Status: COMPLETED | OUTPATIENT
Start: 2019-10-12 | End: 2019-10-12

## 2019-10-12 RX ORDER — METHYLPREDNISOLONE SODIUM SUCCINATE 125 MG/2ML
80 INJECTION, POWDER, LYOPHILIZED, FOR SOLUTION INTRAMUSCULAR; INTRAVENOUS DAILY
Status: DISCONTINUED | OUTPATIENT
Start: 2019-10-12 | End: 2019-10-12

## 2019-10-12 RX ORDER — DIPHENHYDRAMINE HYDROCHLORIDE 50 MG/ML
50 INJECTION INTRAMUSCULAR; INTRAVENOUS ONCE
Status: COMPLETED | OUTPATIENT
Start: 2019-10-12 | End: 2019-10-12

## 2019-10-12 RX ORDER — METHYLPREDNISOLONE SODIUM SUCCINATE 125 MG/2ML
80 INJECTION, POWDER, LYOPHILIZED, FOR SOLUTION INTRAMUSCULAR; INTRAVENOUS ONCE
Status: COMPLETED | OUTPATIENT
Start: 2019-10-12 | End: 2019-10-12

## 2019-10-12 RX ORDER — PREDNISONE 20 MG/1
60 TABLET ORAL DAILY
Qty: 15 TABLET | Refills: 0 | Status: SHIPPED | OUTPATIENT
Start: 2019-10-12 | End: 2019-10-17

## 2019-10-12 RX ADMIN — DIPHENHYDRAMINE HYDROCHLORIDE 50 MG: 50 INJECTION, SOLUTION INTRAMUSCULAR; INTRAVENOUS at 04:03

## 2019-10-12 RX ADMIN — METHYLPREDNISOLONE SODIUM SUCCINATE 80 MG: 125 INJECTION, POWDER, FOR SOLUTION INTRAMUSCULAR; INTRAVENOUS at 04:03

## 2019-10-12 RX ADMIN — SODIUM CHLORIDE 1000 ML: 9 INJECTION, SOLUTION INTRAVENOUS at 04:02

## 2019-10-12 ASSESSMENT — PAIN DESCRIPTION - FREQUENCY: FREQUENCY: CONTINUOUS

## 2019-10-12 ASSESSMENT — PAIN SCALES - GENERAL: PAINLEVEL_OUTOF10: 5

## 2019-10-12 ASSESSMENT — PAIN DESCRIPTION - LOCATION: LOCATION: CHEST

## 2019-10-12 ASSESSMENT — PAIN DESCRIPTION - PAIN TYPE: TYPE: ACUTE PAIN

## 2019-10-12 ASSESSMENT — PAIN DESCRIPTION - ORIENTATION: ORIENTATION: UPPER

## 2019-10-12 ASSESSMENT — PAIN DESCRIPTION - DESCRIPTORS: DESCRIPTORS: PRESSURE;TIGHTNESS

## 2020-01-07 ENCOUNTER — APPOINTMENT (OUTPATIENT)
Dept: GENERAL RADIOLOGY | Age: 55
End: 2020-01-07
Payer: COMMERCIAL

## 2020-01-07 ENCOUNTER — HOSPITAL ENCOUNTER (EMERGENCY)
Age: 55
Discharge: HOME | End: 2020-01-07
Attending: EMERGENCY MEDICINE
Payer: COMMERCIAL

## 2020-01-07 VITALS
TEMPERATURE: 98.7 F | RESPIRATION RATE: 16 BRPM | BODY MASS INDEX: 33.63 KG/M2 | HEART RATE: 85 BPM | SYSTOLIC BLOOD PRESSURE: 123 MMHG | DIASTOLIC BLOOD PRESSURE: 75 MMHG | OXYGEN SATURATION: 97 % | WEIGHT: 178 LBS

## 2020-01-07 LAB
A/G RATIO: 1.5 (ref 1.1–2.2)
ALBUMIN SERPL-MCNC: 4.3 G/DL (ref 3.4–5)
ALP BLD-CCNC: 89 U/L (ref 40–129)
ALT SERPL-CCNC: 34 U/L (ref 10–40)
ANION GAP SERPL CALCULATED.3IONS-SCNC: 13 MMOL/L (ref 3–16)
AST SERPL-CCNC: 32 U/L (ref 15–37)
BASOPHILS ABSOLUTE: 0.1 K/UL (ref 0–0.2)
BASOPHILS RELATIVE PERCENT: 1 %
BILIRUB SERPL-MCNC: <0.2 MG/DL (ref 0–1)
BUN BLDV-MCNC: 17 MG/DL (ref 7–20)
CALCIUM SERPL-MCNC: 9.7 MG/DL (ref 8.3–10.6)
CHLORIDE BLD-SCNC: 104 MMOL/L (ref 99–110)
CO2: 23 MMOL/L (ref 21–32)
CREAT SERPL-MCNC: 0.6 MG/DL (ref 0.6–1.1)
EOSINOPHILS ABSOLUTE: 0.2 K/UL (ref 0–0.6)
EOSINOPHILS RELATIVE PERCENT: 2.3 %
GFR AFRICAN AMERICAN: >60
GFR NON-AFRICAN AMERICAN: >60
GLOBULIN: 2.8 G/DL
GLUCOSE BLD-MCNC: 99 MG/DL (ref 70–99)
HCT VFR BLD CALC: 38.9 % (ref 36–48)
HEMOGLOBIN: 13.2 G/DL (ref 12–16)
LYMPHOCYTES ABSOLUTE: 2.7 K/UL (ref 1–5.1)
LYMPHOCYTES RELATIVE PERCENT: 36.6 %
MCH RBC QN AUTO: 28.7 PG (ref 26–34)
MCHC RBC AUTO-ENTMCNC: 33.8 G/DL (ref 31–36)
MCV RBC AUTO: 84.8 FL (ref 80–100)
MONOCYTES ABSOLUTE: 0.7 K/UL (ref 0–1.3)
MONOCYTES RELATIVE PERCENT: 8.7 %
NEUTROPHILS ABSOLUTE: 3.8 K/UL (ref 1.7–7.7)
NEUTROPHILS RELATIVE PERCENT: 51.4 %
PDW BLD-RTO: 16.1 % (ref 12.4–15.4)
PLATELET # BLD: 326 K/UL (ref 135–450)
PMV BLD AUTO: 7.5 FL (ref 5–10.5)
POTASSIUM REFLEX MAGNESIUM: 3.8 MMOL/L (ref 3.5–5.1)
RBC # BLD: 4.59 M/UL (ref 4–5.2)
SODIUM BLD-SCNC: 140 MMOL/L (ref 136–145)
TOTAL PROTEIN: 7.1 G/DL (ref 6.4–8.2)
TROPONIN: <0.01 NG/ML
WBC # BLD: 7.5 K/UL (ref 4–11)

## 2020-01-07 PROCEDURE — 93005 ELECTROCARDIOGRAM TRACING: CPT | Performed by: EMERGENCY MEDICINE

## 2020-01-07 PROCEDURE — 99283 EMERGENCY DEPT VISIT LOW MDM: CPT

## 2020-01-07 PROCEDURE — 80053 COMPREHEN METABOLIC PANEL: CPT

## 2020-01-07 PROCEDURE — 2500000003 HC RX 250 WO HCPCS: Performed by: EMERGENCY MEDICINE

## 2020-01-07 PROCEDURE — 71045 X-RAY EXAM CHEST 1 VIEW: CPT

## 2020-01-07 PROCEDURE — 96375 TX/PRO/DX INJ NEW DRUG ADDON: CPT

## 2020-01-07 PROCEDURE — 6360000002 HC RX W HCPCS: Performed by: EMERGENCY MEDICINE

## 2020-01-07 PROCEDURE — 96374 THER/PROPH/DIAG INJ IV PUSH: CPT

## 2020-01-07 PROCEDURE — 84484 ASSAY OF TROPONIN QUANT: CPT

## 2020-01-07 PROCEDURE — 85025 COMPLETE CBC W/AUTO DIFF WBC: CPT

## 2020-01-07 RX ORDER — ONDANSETRON HYDROCHLORIDE 8 MG/1
8 TABLET, FILM COATED ORAL EVERY 8 HOURS PRN
Qty: 20 TABLET | Refills: 0 | Status: SHIPPED | OUTPATIENT
Start: 2020-01-07 | End: 2021-06-20 | Stop reason: ALTCHOICE

## 2020-01-07 RX ORDER — ONDANSETRON 2 MG/ML
4 INJECTION INTRAMUSCULAR; INTRAVENOUS ONCE
Status: COMPLETED | OUTPATIENT
Start: 2020-01-07 | End: 2020-01-07

## 2020-01-07 RX ADMIN — FAMOTIDINE 20 MG: 10 INJECTION, SOLUTION INTRAVENOUS at 22:09

## 2020-01-07 RX ADMIN — ONDANSETRON 4 MG: 2 INJECTION INTRAMUSCULAR; INTRAVENOUS at 22:09

## 2020-01-07 ASSESSMENT — PAIN SCALES - GENERAL
PAINLEVEL_OUTOF10: 4
PAINLEVEL_OUTOF10: 0

## 2020-01-07 ASSESSMENT — PAIN DESCRIPTION - FREQUENCY: FREQUENCY: CONTINUOUS

## 2020-01-07 ASSESSMENT — PAIN DESCRIPTION - PAIN TYPE: TYPE: ACUTE PAIN

## 2020-01-07 ASSESSMENT — PAIN DESCRIPTION - DESCRIPTORS: DESCRIPTORS: BURNING

## 2020-01-07 ASSESSMENT — PAIN DESCRIPTION - ONSET: ONSET: SUDDEN

## 2020-01-07 ASSESSMENT — PAIN DESCRIPTION - LOCATION: LOCATION: CHEST

## 2020-01-08 LAB
EKG ATRIAL RATE: 68 BPM
EKG DIAGNOSIS: NORMAL
EKG P-R INTERVAL: 150 MS
EKG Q-T INTERVAL: 380 MS
EKG QRS DURATION: 78 MS
EKG QTC CALCULATION (BAZETT): 404 MS
EKG R AXIS: -81 DEGREES
EKG T AXIS: 213 DEGREES
EKG VENTRICULAR RATE: 68 BPM

## 2020-01-08 PROCEDURE — 93010 ELECTROCARDIOGRAM REPORT: CPT | Performed by: INTERNAL MEDICINE

## 2020-01-08 ASSESSMENT — HEART SCORE: ECG: 1

## 2020-01-08 NOTE — ED PROVIDER NOTES
[facility]   eMERGENCY dEPARTMENT eNCOUnter      Pt Name: Ashanti Burris  MRN: [id number]  [place] 1965  Date of evaluation: 1/7/2020  Provider: Dalton Jorge MD  PCP: Ramsey Mao MD      CHIEF COMPLAINT       Chief Complaint   Patient presents with    Medication Reaction       HISTORY OFPRESENT ILLNESS   (Location/Symptom, Timing/Onset, Context/Setting, Quality, Duration, Modifying Factors,Severity)  Note limiting factors. Ashanti Burris is a 49 y.o. female the medication that she thought was going to be for acid reflux then started having some strange sensations like chest pain that felt like pressure both of her legs felt like they had sharp pains in them her chest pain did not radiate to her neck back or arm she does have some nausea associated with it she rated the pain at a 4 out of 10 she does not have high blood pressure does not have high cholesterol does not have diabetes does not have any family history of coronary artery disease she is not a smoker. She only took 1 of the pills    Nursing Notes were all reviewed and agreed with or any disagreements were addressed  in the HPI. REVIEW OF SYSTEMS    (2-9 systems for level 4, 10 or more for level 5)     Review of Systems    Positives and Pertinent negatives as per HPI. Except as noted above in the ROS, all other systems were reviewed andnegative.        PASTMEDICAL HISTORY     Past Medical History:   Diagnosis Date    Diop's esophagus     Fibromyalgia     IBS (irritable bowel syndrome)          SURGICAL HISTORY       Past Surgical History:   Procedure Laterality Date    COLONOSCOPY      ENDOMETRIAL ABLATION      OTHER SURGICAL HISTORY      leap procedure and uterine ablation    RECTOCELE REPAIR      UPPER GASTROINTESTINAL ENDOSCOPY           CURRENT MEDICATIONS       Previous Medications    IBUPROFEN (ADVIL;MOTRIN) 800 MG TABLET    Take 800 mg by mouth every 6 hours as needed for Pain Normocephalic, without obviousabnormality, atraumatic. Eyes:  conjunctiva/corneas clear, EOM's intact. Sclera anicteric. ENT: Mucous membranes moist.   Neck: Supple, symmetrical, trachea midline, no adenopathy. No jugular venous distention. Lungs:   Clear to auscultation bilaterally, respirationsunlabored. No rales, rhonchi or wheezes. Chest Wall:  No tenderness. Heart:  Regular rate and rhythm, S1 and S2 normal, no murmur, rub or gallop. Abdomen:   Soft, non-tender, bowel sounds active,   no masses, no organomegaly. Extremities: No edema, cords or calf tenderness. Full range of motion. Pulses: 2+ and symmetric   Skin: Turgor is normal, no rashes or lesions. Neurologic: Alert and oriented X 3. No focal findings. Motor grossly normal.  Speech clear, no drift, CN III-XII grossly intact,        DIAGNOSTIC RESULTS   LABS:    Labs Reviewed   CBC WITH AUTO DIFFERENTIAL - Abnormal; Notable for the following components:       Result Value    RDW 16.1 (*)     All other components within normal limits    Narrative:     Performed at:  [place] [address]   Phone [phone] METABOLIC PANEL W/ REFLEX TO MG FOR LOW K    Narrative:     Performed at:  [place] [address],  [place] [address]   Phone [phone]   TROPONIN    Narrative:     Performed at:  [place] [address]   Phone [phone]       All other labs were within normal range or not returned as of this dictation. EKG: All EKG's are interpreted by the Emergency Department Physician who eithersigns or Co-signs this chart in the absence of a cardiologist.    The Ekg interpreted by me in the absence of a cardiologist shows.   Normal Sinus rhythm   Rate of   68  Axis is   Normal  QTc is  within an acceptable range  Intervals and

## 2021-06-20 ENCOUNTER — HOSPITAL ENCOUNTER (EMERGENCY)
Age: 56
Discharge: HOME | End: 2021-06-20
Attending: EMERGENCY MEDICINE
Payer: COMMERCIAL

## 2021-06-20 VITALS
TEMPERATURE: 98.3 F | SYSTOLIC BLOOD PRESSURE: 134 MMHG | HEART RATE: 110 BPM | WEIGHT: 201.2 LBS | RESPIRATION RATE: 16 BRPM | DIASTOLIC BLOOD PRESSURE: 96 MMHG | OXYGEN SATURATION: 95 % | BODY MASS INDEX: 37.99 KG/M2 | HEIGHT: 61 IN

## 2021-06-20 DIAGNOSIS — R55 NEAR SYNCOPE: Primary | ICD-10-CM

## 2021-06-20 DIAGNOSIS — R11.0 NAUSEA: ICD-10-CM

## 2021-06-20 LAB
A/G RATIO: 1.4 (ref 1.1–2.2)
ALBUMIN SERPL-MCNC: 4.6 G/DL (ref 3.4–5)
ALP BLD-CCNC: 81 U/L (ref 40–129)
ALT SERPL-CCNC: 26 U/L (ref 10–40)
ANION GAP SERPL CALCULATED.3IONS-SCNC: 11 MMOL/L (ref 3–16)
AST SERPL-CCNC: 27 U/L (ref 15–37)
BACTERIA: ABNORMAL /HPF
BASOPHILS ABSOLUTE: 0 K/UL (ref 0–0.2)
BASOPHILS RELATIVE PERCENT: 0.9 %
BILIRUB SERPL-MCNC: 0.3 MG/DL (ref 0–1)
BILIRUBIN URINE: NEGATIVE
BLOOD, URINE: ABNORMAL
BUN BLDV-MCNC: 11 MG/DL (ref 7–20)
CALCIUM SERPL-MCNC: 9.7 MG/DL (ref 8.3–10.6)
CHLORIDE BLD-SCNC: 105 MMOL/L (ref 99–110)
CLARITY: ABNORMAL
CO2: 24 MMOL/L (ref 21–32)
COLOR: YELLOW
CREAT SERPL-MCNC: 0.6 MG/DL (ref 0.6–1.1)
EOSINOPHILS ABSOLUTE: 0.2 K/UL (ref 0–0.6)
EOSINOPHILS RELATIVE PERCENT: 3.7 %
EPITHELIAL CELLS, UA: ABNORMAL /HPF (ref 0–5)
GFR AFRICAN AMERICAN: >60
GFR NON-AFRICAN AMERICAN: >60
GLOBULIN: 3.2 G/DL
GLUCOSE BLD-MCNC: 118 MG/DL (ref 70–99)
GLUCOSE URINE: NEGATIVE MG/DL
HCT VFR BLD CALC: 39.2 % (ref 36–48)
HEMOGLOBIN: 12.6 G/DL (ref 12–16)
KETONES, URINE: NEGATIVE MG/DL
LEUKOCYTE ESTERASE, URINE: NEGATIVE
LYMPHOCYTES ABSOLUTE: 1.4 K/UL (ref 1–5.1)
LYMPHOCYTES RELATIVE PERCENT: 26.7 %
MCH RBC QN AUTO: 27.8 PG (ref 26–34)
MCHC RBC AUTO-ENTMCNC: 32.1 G/DL (ref 31–36)
MCV RBC AUTO: 86.7 FL (ref 80–100)
MICROSCOPIC EXAMINATION: YES
MONOCYTES ABSOLUTE: 0.5 K/UL (ref 0–1.3)
MONOCYTES RELATIVE PERCENT: 9.6 %
MUCUS: ABNORMAL /LPF
NEUTROPHILS ABSOLUTE: 3.1 K/UL (ref 1.7–7.7)
NEUTROPHILS RELATIVE PERCENT: 59.1 %
NITRITE, URINE: NEGATIVE
PDW BLD-RTO: 15.2 % (ref 12.4–15.4)
PH UA: 6 (ref 5–8)
PLATELET # BLD: 296 K/UL (ref 135–450)
PMV BLD AUTO: 7.4 FL (ref 5–10.5)
POTASSIUM REFLEX MAGNESIUM: 4.1 MMOL/L (ref 3.5–5.1)
PROTEIN UA: NEGATIVE MG/DL
RBC # BLD: 4.53 M/UL (ref 4–5.2)
RBC UA: ABNORMAL /HPF (ref 0–4)
SODIUM BLD-SCNC: 140 MMOL/L (ref 136–145)
SPECIFIC GRAVITY UA: >=1.03 (ref 1–1.03)
TOTAL PROTEIN: 7.8 G/DL (ref 6.4–8.2)
TROPONIN: <0.01 NG/ML
URINE TYPE: ABNORMAL
UROBILINOGEN, URINE: 0.2 E.U./DL
WBC # BLD: 5.2 K/UL (ref 4–11)
WBC UA: ABNORMAL /HPF (ref 0–5)

## 2021-06-20 PROCEDURE — 93005 ELECTROCARDIOGRAM TRACING: CPT | Performed by: EMERGENCY MEDICINE

## 2021-06-20 PROCEDURE — 84484 ASSAY OF TROPONIN QUANT: CPT

## 2021-06-20 PROCEDURE — 6360000002 HC RX W HCPCS: Performed by: EMERGENCY MEDICINE

## 2021-06-20 PROCEDURE — 85025 COMPLETE CBC W/AUTO DIFF WBC: CPT

## 2021-06-20 PROCEDURE — 96374 THER/PROPH/DIAG INJ IV PUSH: CPT

## 2021-06-20 PROCEDURE — 99283 EMERGENCY DEPT VISIT LOW MDM: CPT

## 2021-06-20 PROCEDURE — 80053 COMPREHEN METABOLIC PANEL: CPT

## 2021-06-20 PROCEDURE — 81001 URINALYSIS AUTO W/SCOPE: CPT

## 2021-06-20 RX ORDER — ONDANSETRON 4 MG/1
4 TABLET, ORALLY DISINTEGRATING ORAL EVERY 8 HOURS PRN
Qty: 12 TABLET | Refills: 1 | Status: SHIPPED | OUTPATIENT
Start: 2021-06-20

## 2021-06-20 RX ORDER — ONDANSETRON 2 MG/ML
4 INJECTION INTRAMUSCULAR; INTRAVENOUS ONCE
Status: COMPLETED | OUTPATIENT
Start: 2021-06-20 | End: 2021-06-20

## 2021-06-20 RX ORDER — CYCLOBENZAPRINE HCL 10 MG
10 TABLET ORAL 3 TIMES DAILY PRN
COMMUNITY

## 2021-06-20 RX ADMIN — ONDANSETRON 4 MG: 2 INJECTION INTRAMUSCULAR; INTRAVENOUS at 16:06

## 2021-06-20 NOTE — ED PROVIDER NOTES
[facility] CHIEF COMPLAINT:   Chief Complaint   Patient presents with    Illness     pt states that she did not feel well last night and then woke up this morning not feeling well and was [place] [address] and her legs started twitching and she became dizzy and lightheaded and thought she was going to pass out and pulled off highway and called 911 and they responded and checked blood sugar which was normal and blood pressure a little high and they offered to take pt or to let her drive herself  here and she decided to drive herself and states that she just feels wierd    Nausea    Dizziness       HPI: Zeina Lopez is a 48 y.o. female who presents to the emergency department with complaint of feeling like she was going to pass out today while driving. Last night the patient states she felt ill but is nonspecific. She states she had some indigestion after eating food she should not eat which is not unusual for her because she has Diop's esophagus. She took some Tums and it did improve. She does take a PPI daily. She denies any chest pain or shortness of breath last night. She went to bed and woke up feeling okay but then later in the day while driving she noted that she felt dizzy, and had some more heartburn. Her legs were shaky while she was driving and she felt like she might pass out. She states she was a little clammy. No UTI symptoms. .  She did have some nausea but no vomiting or diarrhea. There was no abdominal pain. He has been eating and drinking normally. She got off the highway and drove to a package store where she called 911. The life squad came in checked her out stating that her blood pressure was slightly high and her blood sugar was normal.  They offered to bring her to the hospital but she stated she could drive herself. She has not had an episode like this before. Currently she feels slightly nauseated. No chest pain or shortness of breath. No abdominal pain.   She states she did Cymbalta [Duloxetine Hcl]     Nubain [Nalbuphine] Other (See Comments)     Dry moputh  Feels like throat closes    Phenazopyridine Other (See Comments)     Cough, chest tightnes       PHYSICAL EXAM:  VITAL SIGNS: BP (!) 134/96   Pulse 110   Temp 98.3 °F (36.8 °C) (Oral)   Resp 16   Ht 5' 1\" (1.549 m)   Wt 201 lb 3.2 oz (91.3 kg)   SpO2 95%   BMI 38.02 kg/m²   Constitutional:  No acute distress, Non-toxic appearance. Not pale or diaphoretic. No respiratory distress. HENT: Normocephalic, Atraumatic Oropharynx moist, No oral exudates. TMs are normal.  Eyes:  PERRL, EOMI, Conjunctiva normal, No discharge. No nystagmus. Neck: No tenderness, Supple, No lymphadenopathy, No stridor. No carotid bruits. Cardiovascular:  Normal heart rate, Normal rhythm, No murmurs, No rubs, No gallops. Pulmonary/Chest:  Normal breath sounds, No respiratory distress, No wheezing,  Abdomen:   Soft, No tenderness, No masses, No pulsatile masses  Back:  No tenderness, No CVA tenderness  Extremities:  Normal range of motion, Intact distal pulses, No edema, No tenderness  Neurologic:  Alert & oriented x 3, Speech is clear and appropriate, No upper extremity drift or lower extremity weakness,  Normal sensory function, No facial asymmetry, no truncal or extremity ataxia. Normal gait. NIHSS is 0. Negative test of skew. Skin:  Warm, Dry, No erythema, No rash  Psychiatric:  Affect normal, Mood normal      EKG:    EKG interpreted by myself. Normal sinus rhythm at a rate of 93. Axis is -57. Low voltage present. Nonspecific T wave flattening anteriorly. No ectopy. QTC is 430.     Radiology:      LAB  Labs Reviewed   COMPREHENSIVE METABOLIC PANEL W/ REFLEX TO MG FOR LOW K - Abnormal; Notable for the following components:       Result Value    Glucose 118 (*)     All other components within normal limits    Narrative:     Performed at:  [place] [id number],  [address] [place] [address] Phone [phone] - Abnormal; Notable for the following components:    Clarity, UA CLOUDY (*)     Blood, Urine TRACE-INTACT (*)     All other components within normal limits    Narrative:     Performed at:  [place] [address],  [address] [place] [address], [place] [address]   Phone [phone]   MICROSCOPIC URINALYSIS - Abnormal; Notable for the following components:    Mucus, UA 1+ (*)     Epithelial Cells, UA 11-20 (*)     Bacteria, UA 1+ (*)     All other components within normal limits    Narrative:     Performed at:  [place] [address],  [address] [place] [address], [place] [address]   Phone [phone]   CBC WITH AUTO DIFFERENTIAL    Narrative:     Performed at:  [place] [address],  [address] [place] [address], [place] [address]   Phone [phone]   TROPONIN    Narrative:     Performed at:  [place] [address],  [address] [place] [address], [place] [address]   Phone [phone]       ED COURSE & MEDICAL DECISION MAKING:  Pertinent Labs & Imaging studies reviewed. (See chart for details)  60-year-old female with history of fibromyalgia, IBS, Diop's esophagus and intermittent lower back pain with sciatica states she felt ill last night with some indigestion after eating food that she should not eat. She took some Tums and felt better. There was no chest pain or shortness of breath. She woke up today feeling okay but then later in the day while driving states she felt dizzy, had heartburn, nausea and her legs were shaky. She felt like she might pass out but did not pass out. She stopped at a business and called 911. The paramedics came and told her that her blood sugar was normal but her blood pressure was slightly high. She drove here and presents with blood pressure 134/96, pulse 110, afebrile with room air O2 sat 100%. No respiratory distress.   Heart is regular rate and rhythm. Abdomen is benign. She is neurologically intact with NIH SS of 0, normal gait and negative test of skew. EKG shows no ischemia or arrhythmia. The patient is not orthostatic. CBC, CMP, troponin and urinalysis were normal.  She did receive a dose of Zofran IV and stated that she felt better after this. The etiology of the patient's symptoms is unclear. Clinically I see nothing to suggest posterior circulation abnormality, TIA, stroke, surgical abdomen, bowel obstruction or coronary ischemia. Monitor showed no arrhythmia here. No evidence of dehydration, hypoglycemia or infection. I recommended follow-up with her primary care doctor and suggest that she not drive until cleared by her doctor. Advise rest and increase fluids. Recommended she eat lightly for the next few days. She was given a prescription for Zofran to take if needed for nausea. I discussed with Abby Murry the results of the evaluation in the Emergency Department, diagnosis, care, prognosis and the importance of follow-up. The patient is stable for discharge. The patient and/or family are in agreement with the plan and all questions have been answered. Specific discharge instructions were explained, including reasons to return to the emergency department.               (Please note that portions of this note may have been completed with a voice recognition program.  Efforts were made to edit the dictation but occasionally words are mis-transcribed)      FINAL IMPRESSION:  1 --near syncope  2 --nausea                Eric Barahona MD  06/21/21 8020

## 2021-06-20 NOTE — ED NOTES
Pt states that she felt not well last evening and then woke up this morning and was not feeling well and was on the way to get her sister and driving on highway when all of a sudden she started feeling like she was going to pass out. Her legs started shaking and her mouth went try and she became dizzy and nauseated. Pt was able to pull off highway into a gas station and called 911 and they came out and checked vital signs and her blood sugar and told her everything looked good and gave her option to have them take her to er or to drive herself and pt opted to drive herself. Pt arrives stating that she is feeling very nauseated and dizzy and \"just not right. \"     Magaly Hart RN  06/20/21 9345

## 2021-06-21 LAB
EKG ATRIAL RATE: 93 BPM
EKG DIAGNOSIS: NORMAL
EKG P AXIS: 54 DEGREES
EKG P-R INTERVAL: 144 MS
EKG Q-T INTERVAL: 346 MS
EKG QRS DURATION: 78 MS
EKG QTC CALCULATION (BAZETT): 430 MS
EKG R AXIS: -57 DEGREES
EKG T AXIS: -43 DEGREES
EKG VENTRICULAR RATE: 93 BPM

## 2021-06-21 PROCEDURE — 93010 ELECTROCARDIOGRAM REPORT: CPT | Performed by: INTERNAL MEDICINE

## 2024-03-18 ENCOUNTER — APPOINTMENT (OUTPATIENT)
Dept: CT IMAGING | Age: 59
End: 2024-03-18
Payer: COMMERCIAL

## 2024-03-18 ENCOUNTER — HOSPITAL ENCOUNTER (EMERGENCY)
Age: 59
Discharge: HOME | End: 2024-03-19
Attending: EMERGENCY MEDICINE
Payer: COMMERCIAL

## 2024-03-18 DIAGNOSIS — R42 LIGHTHEADEDNESS: ICD-10-CM

## 2024-03-18 DIAGNOSIS — E04.1 THYROID NODULE: ICD-10-CM

## 2024-03-18 DIAGNOSIS — R51.9 ACUTE NONINTRACTABLE HEADACHE, UNSPECIFIED HEADACHE TYPE: Primary | ICD-10-CM

## 2024-03-18 LAB
ALBUMIN SERPL-MCNC: 4.2 G/DL (ref 3.4–5)
ALBUMIN/GLOB SERPL: 1.3 {RATIO} (ref 1.1–2.2)
ALP SERPL-CCNC: 113 U/L (ref 40–129)
ALT SERPL-CCNC: 24 U/L (ref 10–40)
ANION GAP SERPL CALCULATED.3IONS-SCNC: 13 MMOL/L (ref 3–16)
AST SERPL-CCNC: 22 U/L (ref 15–37)
BASOPHILS # BLD: 0.1 K/UL (ref 0–0.2)
BASOPHILS NFR BLD: 0.9 %
BILIRUB SERPL-MCNC: <0.2 MG/DL (ref 0–1)
BUN SERPL-MCNC: 11 MG/DL (ref 7–20)
CALCIUM SERPL-MCNC: 9.2 MG/DL (ref 8.3–10.6)
CHLORIDE SERPL-SCNC: 103 MMOL/L (ref 99–110)
CO2 SERPL-SCNC: 22 MMOL/L (ref 21–32)
CREAT SERPL-MCNC: <0.5 MG/DL (ref 0.6–1.1)
DEPRECATED RDW RBC AUTO: 14.2 % (ref 12.4–15.4)
EOSINOPHIL # BLD: 0.2 K/UL (ref 0–0.6)
EOSINOPHIL NFR BLD: 3.1 %
GFR SERPLBLD CREATININE-BSD FMLA CKD-EPI: >60 ML/MIN/{1.73_M2}
GLUCOSE SERPL-MCNC: 105 MG/DL (ref 70–99)
HCT VFR BLD AUTO: 41.9 % (ref 36–48)
HGB BLD-MCNC: 14.1 G/DL (ref 12–16)
INR PPP: 0.9 (ref 0.84–1.16)
LYMPHOCYTES # BLD: 3.4 K/UL (ref 1–5.1)
LYMPHOCYTES NFR BLD: 44.9 %
MCH RBC QN AUTO: 29.5 PG (ref 26–34)
MCHC RBC AUTO-ENTMCNC: 33.6 G/DL (ref 31–36)
MCV RBC AUTO: 87.8 FL (ref 80–100)
MONOCYTES # BLD: 0.5 K/UL (ref 0–1.3)
MONOCYTES NFR BLD: 6 %
NEUTROPHILS # BLD: 3.4 K/UL (ref 1.7–7.7)
NEUTROPHILS NFR BLD: 45.1 %
PLATELET # BLD AUTO: 320 K/UL (ref 135–450)
PMV BLD AUTO: 8 FL (ref 5–10.5)
POTASSIUM SERPL-SCNC: 4.1 MMOL/L (ref 3.5–5.1)
PROT SERPL-MCNC: 7.5 G/DL (ref 6.4–8.2)
PROTHROMBIN TIME: 12.1 SEC (ref 11.5–14.8)
RBC # BLD AUTO: 4.77 M/UL (ref 4–5.2)
SODIUM SERPL-SCNC: 138 MMOL/L (ref 136–145)
WBC # BLD AUTO: 7.6 K/UL (ref 4–11)

## 2024-03-18 PROCEDURE — 70498 CT ANGIOGRAPHY NECK: CPT

## 2024-03-18 PROCEDURE — 70450 CT HEAD/BRAIN W/O DYE: CPT

## 2024-03-18 PROCEDURE — 89050 BODY FLUID CELL COUNT: CPT

## 2024-03-18 PROCEDURE — 99285 EMERGENCY DEPT VISIT HI MDM: CPT

## 2024-03-18 PROCEDURE — 6360000004 HC RX CONTRAST MEDICATION: Performed by: EMERGENCY MEDICINE

## 2024-03-18 PROCEDURE — 2580000003 HC RX 258: Performed by: EMERGENCY MEDICINE

## 2024-03-18 PROCEDURE — 93005 ELECTROCARDIOGRAM TRACING: CPT | Performed by: EMERGENCY MEDICINE

## 2024-03-18 PROCEDURE — 85610 PROTHROMBIN TIME: CPT

## 2024-03-18 PROCEDURE — 84157 ASSAY OF PROTEIN OTHER: CPT

## 2024-03-18 PROCEDURE — 96376 TX/PRO/DX INJ SAME DRUG ADON: CPT

## 2024-03-18 PROCEDURE — 87070 CULTURE OTHR SPECIMN AEROBIC: CPT

## 2024-03-18 PROCEDURE — 96374 THER/PROPH/DIAG INJ IV PUSH: CPT

## 2024-03-18 PROCEDURE — 62270 DX LMBR SPI PNXR: CPT

## 2024-03-18 PROCEDURE — 6360000002 HC RX W HCPCS: Performed by: EMERGENCY MEDICINE

## 2024-03-18 PROCEDURE — 80053 COMPREHEN METABOLIC PANEL: CPT

## 2024-03-18 PROCEDURE — 82945 GLUCOSE OTHER FLUID: CPT

## 2024-03-18 PROCEDURE — 85025 COMPLETE CBC W/AUTO DIFF WBC: CPT

## 2024-03-18 PROCEDURE — 96375 TX/PRO/DX INJ NEW DRUG ADDON: CPT

## 2024-03-18 PROCEDURE — 87205 SMEAR GRAM STAIN: CPT

## 2024-03-18 RX ORDER — DIPHENHYDRAMINE HYDROCHLORIDE 50 MG/ML
12.5 INJECTION INTRAMUSCULAR; INTRAVENOUS ONCE
Status: COMPLETED | OUTPATIENT
Start: 2024-03-18 | End: 2024-03-18

## 2024-03-18 RX ORDER — ONDANSETRON 2 MG/ML
4 INJECTION INTRAMUSCULAR; INTRAVENOUS ONCE
Status: COMPLETED | OUTPATIENT
Start: 2024-03-18 | End: 2024-03-18

## 2024-03-18 RX ORDER — 0.9 % SODIUM CHLORIDE 0.9 %
1000 INTRAVENOUS SOLUTION INTRAVENOUS ONCE
Status: COMPLETED | OUTPATIENT
Start: 2024-03-18 | End: 2024-03-18

## 2024-03-18 RX ORDER — DIPHENHYDRAMINE HYDROCHLORIDE 50 MG/ML
25 INJECTION INTRAMUSCULAR; INTRAVENOUS ONCE
Status: COMPLETED | OUTPATIENT
Start: 2024-03-18 | End: 2024-03-18

## 2024-03-18 RX ORDER — MORPHINE SULFATE 4 MG/ML
4 INJECTION INTRAVENOUS ONCE
Status: COMPLETED | OUTPATIENT
Start: 2024-03-18 | End: 2024-03-18

## 2024-03-18 RX ADMIN — MORPHINE SULFATE 4 MG: 4 INJECTION, SOLUTION INTRAMUSCULAR; INTRAVENOUS at 23:09

## 2024-03-18 RX ADMIN — IOPAMIDOL 75 ML: 755 INJECTION, SOLUTION INTRAVENOUS at 19:51

## 2024-03-18 RX ADMIN — ONDANSETRON 4 MG: 2 INJECTION INTRAMUSCULAR; INTRAVENOUS at 23:09

## 2024-03-18 RX ADMIN — DIPHENHYDRAMINE HYDROCHLORIDE 25 MG: 50 INJECTION, SOLUTION INTRAMUSCULAR; INTRAVENOUS at 19:40

## 2024-03-18 RX ADMIN — ONDANSETRON 4 MG: 2 INJECTION INTRAMUSCULAR; INTRAVENOUS at 20:12

## 2024-03-18 RX ADMIN — SODIUM CHLORIDE 1000 ML: 9 INJECTION, SOLUTION INTRAVENOUS at 20:11

## 2024-03-18 RX ADMIN — DIPHENHYDRAMINE HYDROCHLORIDE 12.5 MG: 50 INJECTION INTRAMUSCULAR; INTRAVENOUS at 23:09

## 2024-03-18 NOTE — ED PROVIDER NOTES
was placed over the site and the patient was placed in the supine position.    The patient tolerated the procedure well.    Complications: minimal bleeding at puncture site        Medical decision making with differential diagnosis:  Social determinants affecting care:  Chronic conditions affecting care:    Is this patient to be included in the SEP-1 Core Measure due to severe sepsis or septic shock?   No   Exclusion criteria - the patient is NOT to be included for SEP-1 Core Measure due to:  Infection is not suspected    Brief HPI: Patient presents emergency department with abrupt onset of left-sided posterior headache which then generalized.  This was associated with almost immediate lightheadedness or presyncope as well as severe nausea without vomiting.  The history is very concerning for subarachnoid hemorrhage.  She presents with an NIH stroke scale of 0.  The abrupt onset would argue against meningitis furthermore she has no fever.  Patient has been having some neck pain for a few weeks but this did not worsen tonight and has not had any chiropractic manipulation or car accidents to place at increased risk of dissection although this is still in the differential.    Differential: Subarachnoid hemorrhage, intracranial hemorrhage, meningitis, temporal arteritis, tension headache, migraine    MDM: Patient did not require analgesia initially however morphine was given periprocedurally to assist in completing a lumbar puncture.  Her nausea improved with Zofran.  CT of the head which was completed within the first 6 hours of presentation after the headache started was negative for subarachnoid hemorrhage and negative for intracranial hemorrhage.  No severe sinusitis noted.  CTA of the head and neck did not show any obvious aneurysms.  No vertebral or carotid dissections.  No large vessel occlusions.  Patient had no leukocytosis.  Her EKG showed a normal sinus rhythm without ischemic changes and she is not

## 2024-03-19 VITALS
DIASTOLIC BLOOD PRESSURE: 73 MMHG | TEMPERATURE: 97.7 F | HEIGHT: 61 IN | SYSTOLIC BLOOD PRESSURE: 132 MMHG | BODY MASS INDEX: 40.35 KG/M2 | WEIGHT: 213.7 LBS | RESPIRATION RATE: 23 BRPM | OXYGEN SATURATION: 99 % | HEART RATE: 77 BPM

## 2024-03-19 LAB
APPEARANCE CSF: CLEAR
APPEARANCE CSF: CLEAR
CLOT EVALUATION CSF: ABNORMAL
CLOT EVALUATION CSF: ABNORMAL
COLOR CSF: COLORLESS
COLOR CSF: COLORLESS
EKG ATRIAL RATE: 99 BPM
EKG DIAGNOSIS: NORMAL
EKG P AXIS: 40 DEGREES
EKG P-R INTERVAL: 138 MS
EKG Q-T INTERVAL: 350 MS
EKG QRS DURATION: 74 MS
EKG QTC CALCULATION (BAZETT): 449 MS
EKG R AXIS: 9 DEGREES
EKG T AXIS: 55 DEGREES
EKG VENTRICULAR RATE: 99 BPM
GLUCOSE CSF-MCNC: 71 MG/DL (ref 40–80)
MANUAL DIF COMMENT CSF-IMP: ABNORMAL
MANUAL DIF COMMENT CSF-IMP: ABNORMAL
NUC CELL # FLD MANUAL: 1 /CUMM (ref 0–5)
NUC CELL # FLD MANUAL: 3 /CUMM (ref 0–5)
PROT CSF-MCNC: 17 MG/DL (ref 15–45)
RBC # FLD MANUAL: 3 /CUMM
RBC # FLD MANUAL: 39 /CUMM
TUBE # CSF: ABNORMAL
TUBE # CSF: ABNORMAL

## 2024-03-19 PROCEDURE — 93010 ELECTROCARDIOGRAM REPORT: CPT | Performed by: INTERNAL MEDICINE

## 2024-03-19 RX ORDER — ONDANSETRON 4 MG/1
4 TABLET, FILM COATED ORAL EVERY 8 HOURS PRN
Qty: 10 TABLET | Refills: 0 | Status: SHIPPED | OUTPATIENT
Start: 2024-03-19 | End: 2024-03-29

## 2024-03-19 NOTE — DISCHARGE INSTRUCTIONS
Rest and limited activity for next 24-48 hours to reduce likelihood of spinal headache. Fluids and caffeine if tolerated.  Return for fever, persistent severe headache, visual changes, increased dizziness or fainting, persistent vomiting, numbness or weakness.    A thyroid nodule on left side was incidentally found on CT today. Ultrasound was recommended for followup.

## 2024-03-20 LAB
BACTERIA CSF CULT: NORMAL
GRAM STN SPEC: NORMAL

## 2024-03-26 LAB
BACTERIA CSF CULT: NORMAL
GRAM STN SPEC: NORMAL